# Patient Record
Sex: FEMALE
[De-identification: names, ages, dates, MRNs, and addresses within clinical notes are randomized per-mention and may not be internally consistent; named-entity substitution may affect disease eponyms.]

---

## 2020-09-03 ENCOUNTER — HOSPITAL ENCOUNTER (OUTPATIENT)
Dept: HOSPITAL 62 - WI | Age: 45
End: 2020-09-03
Attending: NURSE PRACTITIONER
Payer: MEDICAID

## 2020-09-03 DIAGNOSIS — Z12.31: Primary | ICD-10-CM

## 2020-09-03 PROCEDURE — 77067 SCR MAMMO BI INCL CAD: CPT

## 2020-09-03 PROCEDURE — 77063 BREAST TOMOSYNTHESIS BI: CPT

## 2020-10-21 ENCOUNTER — HOSPITAL ENCOUNTER (OUTPATIENT)
Dept: HOSPITAL 62 - OD | Age: 45
End: 2020-10-21
Attending: NURSE PRACTITIONER
Payer: MEDICAID

## 2020-10-21 DIAGNOSIS — M51.26: Primary | ICD-10-CM

## 2020-10-21 PROCEDURE — 72110 X-RAY EXAM L-2 SPINE 4/>VWS: CPT

## 2020-10-21 NOTE — RADIOLOGY REPORT (SQ)
EXAM DESCRIPTION:  LUMBAR SPINE COMPLETE



IMAGES COMPLETED DATE/TIME:  10/21/2020 11:23 am



REASON FOR STUDY:  OTHER INTERVERTEBRAL DISC DISPLACEMENT, LUMBAR REGION M51.26  OTHER INTERVERTEBRAL
 DISC DISPLACEMENT, LUMBAR REGION



COMPARISON:  None.



NUMBER OF VIEWS:  Five views including obliques.



TECHNIQUE:  AP, lateral, oblique, and sacral radiographic images acquired of the lumbar spine.



LIMITATIONS:  None.



FINDINGS:  MINERALIZATION: Normal.

SEGMENTATION: Normal.  No transitional anatomy.

ALIGNMENT: Normal.

VERTEBRAE: Maintained height.  No fracture or worrisome bone lesion.

DISCS: Preserved height.  No significant osteophytes or end plate irregularity.

POSTERIOR ELEMENTS: Pedicles and facets are intact.  No pars defect or posterior arch defects.

HARDWARE: None in the spine.

PARASPINAL SOFT TISSUES: Normal.

PELVIS: Intact as visualized. No fractures or worrisome bone lesions. SI joints intact.

OTHER: No other significant finding.



IMPRESSION:  NORMAL 5 VIEW LUMBAR SPINE.



TECHNICAL DOCUMENTATION:  JOB ID:  [id number]

 2011 [employer]- All Rights Reserved



Reading location - IP/workstation name: [email or web address]

## 2020-12-21 ENCOUNTER — HOSPITAL ENCOUNTER (OUTPATIENT)
Dept: HOSPITAL 62 - RAD | Age: 45
End: 2020-12-21
Attending: INTERNAL MEDICINE
Payer: MEDICAID

## 2020-12-21 DIAGNOSIS — R07.9: Primary | ICD-10-CM

## 2020-12-21 DIAGNOSIS — I10: ICD-10-CM

## 2020-12-21 PROCEDURE — 93017 CV STRESS TEST TRACING ONLY: CPT

## 2020-12-21 PROCEDURE — 78452 HT MUSCLE IMAGE SPECT MULT: CPT

## 2020-12-21 PROCEDURE — A9500 TC99M SESTAMIBI: HCPCS

## 2020-12-21 NOTE — DRAGON STRESS TEST REPORT
Pharmacological nuclear stress test



Date: 12/21/2020

Referring physician:  Dr. Kenyon Hanley.

Performing physician: Javier Leung MD

Indication: Chest pain



Clinical history

45-year-old lady with medical history of systemic hypertension who complained of
chest pain.  Referring physician has requested a pharmacological nuclear stress 
test.



Procedure

The patient presented to the stress lab.  Initially rest images were obtained 
according to standard protocol after the injection of 14.82millicurie technetium
99m sestamibi.  Subsequently the patient underwent pharmacological stress 
utilizing 0.4 mg of regadenoson intravenously.  The patient's EKG and vital 
signs were monitored throughout the procedure.  Subsequently patient was 
injected with 44.1 millicuries of technetium 99m sestamibi.  After a period of 
rest, stress images were obtained according to standard protocol.



EKG showed sinus rhythm at 63 beats per minute.  The patient's stress EKG did 
not show any evidence for myocardial ischemia.  There were no arrhythmias 
observed.



Raw as well as processed rest and stress images were reviewed.  There was mild 
to moderate gut uptake of radioactive isotope which did not interfere with the 
study.  The rest and stress images show uniform uptake of radioactive isotope 
without any fixed or reversible defects to suggest myocardial ischemia or 
myocardial infarction.  There is normal contractility post-rest.  The calculated
ejection fraction is 57 %.  The TID ratio is 1.08.



Conclusion

The stress EKG is negative for myocardial ischemia  

There is no scintigraphic evidence of myocardial infarction or ischemia provoked
by pharmacological stress. 

There is normal contractility post-stress.

The gated left ventricular ejection fraction is 57 %.







MTDD

## 2021-01-05 ENCOUNTER — HOSPITAL ENCOUNTER (OUTPATIENT)
Dept: HOSPITAL 62 - OD | Age: 46
End: 2021-01-05
Attending: PHYSICIAN ASSISTANT
Payer: MEDICAID

## 2021-01-05 DIAGNOSIS — I10: Primary | ICD-10-CM

## 2021-01-05 DIAGNOSIS — Z79.899: ICD-10-CM

## 2021-01-05 LAB
ANION GAP SERPL CALC-SCNC: 9 MMOL/L (ref 5–19)
BUN SERPL-MCNC: 16 MG/DL (ref 7–20)
CALCIUM: 9.7 MG/DL (ref 8.4–10.2)
CHLORIDE SERPL-SCNC: 105 MMOL/L (ref 98–107)
CO2 SERPL-SCNC: 26 MMOL/L (ref 22–30)
GLUCOSE SERPL-MCNC: 76 MG/DL (ref 75–110)
POTASSIUM SERPL-SCNC: 3.8 MMOL/L (ref 3.6–5)

## 2021-01-05 PROCEDURE — 36415 COLL VENOUS BLD VENIPUNCTURE: CPT

## 2021-01-05 PROCEDURE — 80048 BASIC METABOLIC PNL TOTAL CA: CPT
